# Patient Record
Sex: MALE | Race: WHITE | NOT HISPANIC OR LATINO | Employment: FULL TIME | URBAN - NONMETROPOLITAN AREA
[De-identification: names, ages, dates, MRNs, and addresses within clinical notes are randomized per-mention and may not be internally consistent; named-entity substitution may affect disease eponyms.]

---

## 2024-10-17 ENCOUNTER — TELEPHONE (OUTPATIENT)
Dept: CARDIOLOGY | Facility: CLINIC | Age: 59
End: 2024-10-17
Payer: COMMERCIAL

## 2024-10-17 NOTE — TELEPHONE ENCOUNTER
Caller: Quentin Patrick    Relationship to patient: Self    Best call back number: [phone]    Chief complaint: OUTSIDE OF NEW PATIENT WINDOW    Type of visit: NEW PATIENT    Requested date: ASAP     If rescheduling, when is the original appointment: 12.19.24

## 2024-11-05 ENCOUNTER — OFFICE VISIT (OUTPATIENT)
Dept: CARDIOLOGY | Facility: CLINIC | Age: 59
End: 2024-11-05
Payer: COMMERCIAL

## 2024-11-05 VITALS
DIASTOLIC BLOOD PRESSURE: 82 MMHG | OXYGEN SATURATION: 94 % | SYSTOLIC BLOOD PRESSURE: 123 MMHG | BODY MASS INDEX: 22.35 KG/M2 | WEIGHT: 165 LBS | HEIGHT: 72 IN | HEART RATE: 100 BPM

## 2024-11-05 DIAGNOSIS — R42 DIZZINESS: ICD-10-CM

## 2024-11-05 DIAGNOSIS — R06.09 DYSPNEA ON EXERTION: ICD-10-CM

## 2024-11-05 DIAGNOSIS — I10 PRIMARY HYPERTENSION: ICD-10-CM

## 2024-11-05 DIAGNOSIS — R07.2 PRECORDIAL PAIN: ICD-10-CM

## 2024-11-05 PROCEDURE — 93000 ELECTROCARDIOGRAM COMPLETE: CPT | Performed by: PHYSICIAN ASSISTANT

## 2024-11-05 PROCEDURE — 99204 OFFICE O/P NEW MOD 45 MIN: CPT | Performed by: PHYSICIAN ASSISTANT

## 2024-11-05 RX ORDER — CITALOPRAM HYDROBROMIDE 10 MG/1
1 TABLET ORAL DAILY
COMMUNITY
Start: 2024-09-28

## 2024-11-05 RX ORDER — IBUPROFEN 800 MG/1
800 TABLET, FILM COATED ORAL EVERY 8 HOURS PRN
COMMUNITY
Start: 2024-10-27

## 2024-11-05 RX ORDER — ATORVASTATIN CALCIUM 10 MG/1
1 TABLET, FILM COATED ORAL DAILY
COMMUNITY
Start: 2024-10-27

## 2024-11-05 RX ORDER — PREDNISONE 10 MG/1
10 TABLET ORAL DAILY PRN
COMMUNITY
Start: 2024-10-27

## 2024-11-05 RX ORDER — ALBUTEROL SULFATE 90 UG/1
2 INHALANT RESPIRATORY (INHALATION) EVERY 4 HOURS PRN
COMMUNITY

## 2024-11-05 RX ORDER — TIOTROPIUM BROMIDE 18 UG/1
1 CAPSULE ORAL; RESPIRATORY (INHALATION)
COMMUNITY
Start: 2024-09-16

## 2024-11-05 RX ORDER — RANOLAZINE 500 MG/1
500 TABLET, EXTENDED RELEASE ORAL 2 TIMES DAILY
Qty: 60 TABLET | Refills: 11 | Status: SHIPPED | OUTPATIENT
Start: 2024-11-05

## 2024-11-05 RX ORDER — OLMESARTAN MEDOXOMIL 40 MG/1
1 TABLET ORAL DAILY
COMMUNITY
Start: 2024-08-28

## 2024-11-05 RX ORDER — AMLODIPINE BESYLATE 10 MG/1
1 TABLET ORAL DAILY
COMMUNITY
Start: 2024-09-26

## 2024-11-05 RX ORDER — CYCLOBENZAPRINE HCL 10 MG
1 TABLET ORAL 3 TIMES DAILY
COMMUNITY
Start: 2024-08-20

## 2024-11-05 RX ORDER — MONTELUKAST SODIUM 10 MG/1
1 TABLET ORAL DAILY
COMMUNITY
Start: 2024-09-23

## 2024-11-05 NOTE — PROGRESS NOTES
Subjective   Quentin Patrick is a 59 y.o. male     Chief Complaint   Patient presents with    Establish Care     Cardiac eval - low diffusion on PFT per Dr. Sierra's office, CT chest report obtained from Dr. Sierra's office. Report in chart. Patient had stress / echo by PCP. Copy's of reports obtained.     Shortness of Breath    Follow-up    Fatigue    Dizziness    Chest Pain       HPI  The patient presents in the clinic today to establish care.  He is referred primarily by his pulmonology team, given entire clinical scenario.  The patient was initially evaluated by pulmonology, Dr. Sierra, given significant dyspnea noted at the time.  He was seen and scheduled for PFT.  PFT apparently was largely benign with the exception of a low diffusion capacity.  CT scan was eventually performed and indicated no coronary calcification or aneurysmal formation.  Still, given severity of symptoms, outside of the realm but felt possible by pulmonary testing, he was recommended to have cardiac evaluation.  He was seen by his primary care provider and scheduled for stress and echo studies.  Stress test indicated no evidence of ischemia.  Echo indicated normal systolic function, diastolic dysfunction, PA pressures at 35 mmHg, no significant valvular nor structural abnormalities.  He has now been referred on for further evaluation.  He is seen today and an EKG has been performed.  He indicates sinus rhythm with PVC, normal axis, and rather diffuse ST and T wave abnormalities, with ST segment depression across leads on the EKG.  The patient reports ongoing dyspnea which is severe and limiting.  He has chest tightness when rather marked symptoms are noted.  He occasionally will have associated neck and left arm pain.  He has had no PND nor orthopnea.  He has no edema.  He is very concerned with symptoms given severity and presents today to discuss any potential cardiac evaluation.      Current Outpatient Medications   Medication Sig Dispense  Refill    albuterol sulfate  (90 Base) MCG/ACT inhaler Inhale 2 puffs Every 4 (Four) Hours As Needed for Wheezing.      amLODIPine (NORVASC) 10 MG tablet Take 1 tablet by mouth Daily.      atorvastatin (LIPITOR) 10 MG tablet Take 1 tablet by mouth Daily.      citalopram (CeleXA) 10 MG tablet Take 1 tablet by mouth Daily.      cyclobenzaprine (FLEXERIL) 10 MG tablet Take 1 tablet by mouth 3 times a day.      ibuprofen (ADVIL,MOTRIN) 800 MG tablet Take 1 tablet by mouth Every 8 (Eight) Hours As Needed for Moderate Pain.      montelukast (SINGULAIR) 10 MG tablet Take 1 tablet by mouth Daily.      olmesartan (BENICAR) 40 MG tablet Take 1 tablet by mouth Daily.      predniSONE (DELTASONE) 10 MG tablet Take 1 tablet by mouth Daily As Needed (shortness of breath).      Spiriva HandiHaler 18 MCG per inhalation capsule Place 1 capsule into inhaler and inhale Daily.      ranolazine (Ranexa) 500 MG 12 hr tablet Take 1 tablet by mouth 2 (Two) Times a Day. 60 tablet 11     No current facility-administered medications for this visit.       Patient has no known allergies.    Past Medical History:   Diagnosis Date    Asthma     Depression     Hyperlipidemia     Hypertension        Social History     Socioeconomic History    Marital status:    Tobacco Use    Smoking status: Never    Smokeless tobacco: Current     Types: Snuff   Substance and Sexual Activity    Alcohol use: Yes     Alcohol/week: 12.0 standard drinks of alcohol     Types: 12 Cans of beer per week    Drug use: Never    Sexual activity: Defer       Family History   Problem Relation Age of Onset    Heart attack Mother     Heart disease Mother     Hypertension Mother     Stroke Mother     COPD Father        Review of Systems   Constitutional:  Positive for fatigue. Negative for chills, diaphoresis and fever.   HENT: Negative.     Eyes: Negative.  Negative for visual disturbance.   Respiratory:  Positive for cough, shortness of breath and wheezing. Negative  "for apnea and chest tightness.    Cardiovascular:  Positive for chest pain and palpitations. Negative for leg swelling.   Gastrointestinal: Negative.  Negative for abdominal pain and blood in stool.   Endocrine: Negative.    Genitourinary: Negative.  Negative for hematuria.   Musculoskeletal:  Positive for arthralgias (hips and knees and lower back). Negative for back pain, myalgias, neck pain and neck stiffness.   Skin: Negative.  Negative for rash and wound.   Allergic/Immunologic: Positive for environmental allergies (seasonal). Negative for food allergies.   Neurological:  Positive for dizziness, syncope and weakness. Negative for light-headedness, numbness and headaches.   Hematological: Negative.  Does not bruise/bleed easily.   Psychiatric/Behavioral:  Positive for sleep disturbance.        Objective     Vitals:    11/05/24 0951   BP: 123/82   Pulse: 100   SpO2: 94%   Weight: 74.8 kg (165 lb)   Height: 182.9 cm (72\")        /82   Pulse 100   Ht 182.9 cm (72\")   Wt 74.8 kg (165 lb)   SpO2 94%   BMI 22.38 kg/m²      Lab Results (most recent)       None            Physical Exam  Vitals and nursing note reviewed.   Constitutional:       General: He is not in acute distress.     Appearance: He is well-developed.   HENT:      Head: Normocephalic and atraumatic.   Eyes:      Conjunctiva/sclera: Conjunctivae normal.      Pupils: Pupils are equal, round, and reactive to light.   Neck:      Vascular: No JVD.      Trachea: No tracheal deviation.   Cardiovascular:      Rate and Rhythm: Normal rate and regular rhythm.      Heart sounds: Normal heart sounds.   Pulmonary:      Effort: Pulmonary effort is normal.      Breath sounds: Normal breath sounds.   Abdominal:      General: Bowel sounds are normal. There is no distension.      Palpations: Abdomen is soft. There is no mass.      Tenderness: There is no abdominal tenderness. There is no guarding or rebound.   Musculoskeletal:         General: No tenderness or " deformity. Normal range of motion.      Cervical back: Normal range of motion and neck supple.   Skin:     General: Skin is warm and dry.      Coloration: Skin is not pale.      Findings: No erythema or rash.   Neurological:      Mental Status: He is alert and oriented to person, place, and time.   Psychiatric:         Behavior: Behavior normal.         Thought Content: Thought content normal.         Judgment: Judgment normal.         Procedure     ECG 12 Lead    Date/Time: 11/5/2024 10:00 AM  Performed by: Darnell Ann PA    Authorized by: Darnell Ann PA  Comparison: not compared with previous ECG   Comments: Sinus rhythm, PVC noted, normal axis, PAC noted as well, diffuse nonspecific ST and T wave changes, no acute changes noted.               Assessment & Plan      Diagnosis Plan   1. Dyspnea on exertion        2. Precordial pain        3. Primary hypertension        4. Dizziness  Duplex Carotid Ultrasound CAR        1.  The patient presents in the clinic today to establish cardiovascular care.  There is concern given severity of dyspnea, intermittent chest tightness, and symptoms otherwise.  His pulmonary evaluation was not felt abnormal enough to explain symptoms and he was advised to have cardiac evaluation.    2.  His primary care provider did schedule him for stress and an echo.  Stress indicated no evidence of ischemia.  Echo indicated normal systolic function with no significant valvular or structural abnormalities.  He had no significant abnormalities in either study.    3.  I am still concerned with some of his symptoms.  I want to empirically treat the patient with Ranexa.  I do not feel that he can tolerate any further antianginal therapies which might have blood pressure lowering properties.  He has rather severe orthostasis at times, with recurrent presyncope.  We have reviewed physical maneuvers and lifestyle changes to address the same.  Still, I would utilize Ranexa instead of  long-acting nitrate therapy, beta-blocker, etc.    4.  We will see him back in 1 month to evaluate response to medications.  If no improvement is noted, I may well consider cardiac CTA at that time.  I feel we need eventually a better evaluation of coronary anatomy given clinical scenario.    5.  Not noted above, the patient has rather impressive symptoms of dizziness at times.  This is outside described orthostatic issues.  I would schedule for carotid duplex.  He would not want to have this performed.    6.  No further adjustments will be made.  Further pending above.           Quentin Darnell  reports that he has never smoked. His smokeless tobacco use includes snuff. I have educated him on the risk of diseases from using tobacco products such as cancer, COPD, and heart disease.     I advised him to quit and he is not willing to quit.    I spent 3  minutes counseling the patient.                   Electronically signed by:

## 2024-11-14 ENCOUNTER — HOSPITAL ENCOUNTER (OUTPATIENT)
Dept: CARDIOLOGY | Facility: HOSPITAL | Age: 59
Discharge: HOME | End: 2024-11-14
Admitting: PHYSICIAN ASSISTANT
Payer: COMMERCIAL

## 2024-11-14 DIAGNOSIS — R42 DIZZINESS: ICD-10-CM

## 2024-11-14 LAB
BH CV XLRA MEAS LEFT CAROTID BULB EDV: -19.1 CM/SEC
BH CV XLRA MEAS LEFT CAROTID BULB PSV: -66.7 CM/SEC
BH CV XLRA MEAS LEFT DIST CCA EDV: -24.3 CM/SEC
BH CV XLRA MEAS LEFT DIST CCA PSV: -88.4 CM/SEC
BH CV XLRA MEAS LEFT DIST ICA EDV: -28.6 CM/SEC
BH CV XLRA MEAS LEFT DIST ICA PSV: -80.1 CM/SEC
BH CV XLRA MEAS LEFT ICA/CCA RATIO: 1.2
BH CV XLRA MEAS LEFT MID ICA EDV: -35 CM/SEC
BH CV XLRA MEAS LEFT MID ICA PSV: -104 CM/SEC
BH CV XLRA MEAS LEFT PROX CCA EDV: 26.9 CM/SEC
BH CV XLRA MEAS LEFT PROX CCA PSV: 129 CM/SEC
BH CV XLRA MEAS LEFT PROX ECA PSV: -77.1 CM/SEC
BH CV XLRA MEAS LEFT PROX ICA EDV: -30.1 CM/SEC
BH CV XLRA MEAS LEFT PROX ICA PSV: -102 CM/SEC
BH CV XLRA MEAS LEFT VERTEBRAL A EDV: -21.1 CM/SEC
BH CV XLRA MEAS LEFT VERTEBRAL A PSV: -56.5 CM/SEC
BH CV XLRA MEAS RIGHT CAROTID BULB EDV: -23.5 CM/SEC
BH CV XLRA MEAS RIGHT CAROTID BULB PSV: -85.5 CM/SEC
BH CV XLRA MEAS RIGHT DIST CCA EDV: 21.2 CM/SEC
BH CV XLRA MEAS RIGHT DIST CCA PSV: 93.3 CM/SEC
BH CV XLRA MEAS RIGHT DIST ICA EDV: -25.9 CM/SEC
BH CV XLRA MEAS RIGHT DIST ICA PSV: -100 CM/SEC
BH CV XLRA MEAS RIGHT ICA/CCA RATIO: 1.1
BH CV XLRA MEAS RIGHT MID ICA EDV: -18.7 CM/SEC
BH CV XLRA MEAS RIGHT MID ICA PSV: -74.7 CM/SEC
BH CV XLRA MEAS RIGHT PROX CCA EDV: 18 CM/SEC
BH CV XLRA MEAS RIGHT PROX CCA PSV: 106 CM/SEC
BH CV XLRA MEAS RIGHT PROX ECA PSV: -98.8 CM/SEC
BH CV XLRA MEAS RIGHT PROX ICA EDV: -17.6 CM/SEC
BH CV XLRA MEAS RIGHT PROX ICA PSV: -76.8 CM/SEC
BH CV XLRA MEAS RIGHT VERTEBRAL A EDV: 9 CM/SEC
BH CV XLRA MEAS RIGHT VERTEBRAL A PSV: 36.5 CM/SEC

## 2024-11-14 PROCEDURE — 93880 EXTRACRANIAL BILAT STUDY: CPT

## 2024-12-03 ENCOUNTER — TELEPHONE (OUTPATIENT)
Dept: CARDIOLOGY | Facility: CLINIC | Age: 59
End: 2024-12-03
Payer: COMMERCIAL

## 2024-12-03 NOTE — TELEPHONE ENCOUNTER
Patient return called, he advised he is still having some shortness of breath with exertion but it has improved a lot. He feels a big improvement since starting medication. No chest pain or worsening symptoms. Advised would make Darnell and his nurse aware.      Anything new or worsening patient is aware to contact office back to discuss further testing, as he feels the medication at this time is helping.     Megan Mahmood MA     Consent: Written consent was obtained and risks were reviewed including but not limited to scarring, infection, bleeding, scabbing, incomplete removal, nerve damage and allergy to anesthesia.

## 2024-12-03 NOTE — TELEPHONE ENCOUNTER
Patient on for call for symptom check and to see how he is responding to Ranexa, if no improvement in symptom's may consider cardiac cta.     Called patient no answer, left voice message requesting return call.

## 2024-12-05 ENCOUNTER — TELEPHONE (OUTPATIENT)
Dept: CARDIOLOGY | Facility: CLINIC | Age: 59
End: 2024-12-05
Payer: COMMERCIAL

## 2024-12-05 NOTE — TELEPHONE ENCOUNTER
Our office did not put patient off work, patient reports it was Dr. Sierra. He was advised to have paperwork faxed to her office for processing.

## 2024-12-05 NOTE — TELEPHONE ENCOUNTER
Caller: Quentin Patrick    Relationship: Self    Best call back number: [phone]    What form or medical record are you requesting: LONG TERM DISABILITY PAPERWORK     Who is requesting this form or medical record from you: [employer] (PATIENT [employer]) [employer]    How would you like to receive the form or medical records (pick-up, mail, fax): FAX  If fax, what is the fax number: [phone]  If mail, what is the address:   If pick-up, provide patient with address and location details  PHONE NUMBER: [phone]    Timeframe paperwork needed: ASAP    Additional notes: PATIENT IS NEEDING PAPER WORK FILLED OUT FOR HIS LONG TERM DISABILITY. HE STATES PAPER WORK SHOULD HAVE BEEN IN OFFICE TUESDAY. PLEASE FAX BACK TO COMPANY IT CAME FROM : [employer]. PLEASE REACH OUT TO PATIENT WHEN PAPER WORK HAS BEEN DONE AND FAXED.

## 2024-12-10 ENCOUNTER — TELEPHONE (OUTPATIENT)
Dept: CARDIOLOGY | Facility: CLINIC | Age: 59
End: 2024-12-10
Payer: COMMERCIAL

## 2024-12-10 NOTE — TELEPHONE ENCOUNTER
Patient notified of results and incidental finding. He will follow up with PCP. Report faxed.     ----- Message from Darnell Ann sent at 12/10/2024 12:02 AM EST -----  Routine follow-up based on carotid anatomy.  Thyroid nodule noted, copy forward to PCP.  ----- Message -----  From: Toñito Biswas MD  Sent: 12/2/2024   9:50 PM EST  To: AMY Pearson    Result Text       Antegrade right vertebral flow.    Antegrade left vertebral flow.     Excellent technical quality.     1.  Both common carotid arteries are widely patent and are mild discrete atheroma.     2.  Mild bifurcation disease bilaterally less than or equal to 15% stenosis by Doppler and no visible atheroma on the right.  There is mild eccentric plaque in the bifurcation the left resulting in less than or equal to 15% stenosis by both echo and Doppler.     3.  There is an eccentric plaque in the proximal right internal carotid artery with less than or equal to 15% stenosis by Doppler and less than 25% luminal encroachment by echo.  Minimal irregularities only are noted in the left internal carotid artery with Doppler compatible with less than 16% stenosis.     4.  Antegrade flow both vertebral arteries     Summary: No evidence of hemodynamically significant stenosis in either carotid system as detailed above.  Antegrade flow both vertebral arteries.  An incidental finding is a right-sided thyroid nodule.  The need for an type of further evaluation of that finding is deferred to the patient's primary healthcare providers.

## 2025-08-06 ENCOUNTER — OFFICE VISIT (OUTPATIENT)
Dept: CARDIOLOGY | Facility: CLINIC | Age: 60
End: 2025-08-06
Payer: MEDICAID

## 2025-08-06 VITALS
DIASTOLIC BLOOD PRESSURE: 81 MMHG | OXYGEN SATURATION: 98 % | BODY MASS INDEX: 17.07 KG/M2 | WEIGHT: 126 LBS | HEIGHT: 72 IN | SYSTOLIC BLOOD PRESSURE: 139 MMHG | HEART RATE: 70 BPM

## 2025-08-06 DIAGNOSIS — R06.09 DYSPNEA ON EXERTION: Primary | ICD-10-CM

## 2025-08-06 DIAGNOSIS — I10 PRIMARY HYPERTENSION: ICD-10-CM

## 2025-08-06 DIAGNOSIS — R07.2 PRECORDIAL PAIN: ICD-10-CM

## 2025-08-06 PROCEDURE — 1159F MED LIST DOCD IN RCRD: CPT | Performed by: PHYSICIAN ASSISTANT

## 2025-08-06 PROCEDURE — 1160F RVW MEDS BY RX/DR IN RCRD: CPT | Performed by: PHYSICIAN ASSISTANT

## 2025-08-06 PROCEDURE — 99213 OFFICE O/P EST LOW 20 MIN: CPT | Performed by: PHYSICIAN ASSISTANT

## 2025-08-06 RX ORDER — PANTOPRAZOLE SODIUM 40 MG/1
40 TABLET, DELAYED RELEASE ORAL DAILY
COMMUNITY
Start: 2025-06-05